# Patient Record
Sex: MALE | Race: WHITE | HISPANIC OR LATINO | Employment: STUDENT | URBAN - METROPOLITAN AREA
[De-identification: names, ages, dates, MRNs, and addresses within clinical notes are randomized per-mention and may not be internally consistent; named-entity substitution may affect disease eponyms.]

---

## 2017-01-06 ENCOUNTER — TELEPHONE (OUTPATIENT)
Dept: ELECTROPHYSIOLOGY | Facility: CLINIC | Age: 22
End: 2017-01-06

## 2017-01-06 NOTE — TELEPHONE ENCOUNTER
Nurse tried to call Mr Shaw to confirm appt times and review instructions but unable to leave message b/c voice mail not set up. Nurse than called his  from [facility], Mr Fredi Gu and reviewed instructions of labs on 1/10 here at [facility] and procedure  1/12/17 at 6am and not to eat anything after midnight on 1/11/17. Mr Gu acknowledged message and will relay to Mr Shaw and have him call me to confirm.

## 2017-01-06 NOTE — TELEPHONE ENCOUNTER
Nurse called and spoke with Mr Shaw and confirmed labs on 1/10/17 here at main campus and procedure 1/12/17 at 6am arrival. Reminded patient of npo after midnight on 1/11/17. Patient acknowledged.    ----- Message from Jenna Beck sent at 1/6/2017 10:06 AM CST -----  Contact: Pt  Pt returned call in regards to instructions    Pt can be reached at [phone] or [phone]    Thank you   NW

## 2017-01-10 ENCOUNTER — LAB VISIT (OUTPATIENT)
Dept: LAB | Facility: HOSPITAL | Age: 22
End: 2017-01-10
Attending: INTERNAL MEDICINE
Payer: COMMERCIAL

## 2017-01-10 ENCOUNTER — TELEPHONE (OUTPATIENT)
Dept: ELECTROPHYSIOLOGY | Facility: CLINIC | Age: 22
End: 2017-01-10

## 2017-01-10 DIAGNOSIS — I45.6 WPW (WOLFF-PARKINSON-WHITE SYNDROME): ICD-10-CM

## 2017-01-10 LAB
ANION GAP SERPL CALC-SCNC: 7 MMOL/L
APTT BLDCRRT: 26.3 SEC
BASOPHILS # BLD AUTO: 0.02 K/UL
BASOPHILS NFR BLD: 0.3 %
BUN SERPL-MCNC: 17 MG/DL
CALCIUM SERPL-MCNC: 9.5 MG/DL
CHLORIDE SERPL-SCNC: 104 MMOL/L
CO2 SERPL-SCNC: 30 MMOL/L
CREAT SERPL-MCNC: 0.9 MG/DL
DIFFERENTIAL METHOD: ABNORMAL
EOSINOPHIL # BLD AUTO: 0.3 K/UL
EOSINOPHIL NFR BLD: 4.8 %
ERYTHROCYTE [DISTWIDTH] IN BLOOD BY AUTOMATED COUNT: 13.4 %
EST. GFR  (AFRICAN AMERICAN): >60 ML/MIN/1.73 M^2
EST. GFR  (NON AFRICAN AMERICAN): >60 ML/MIN/1.73 M^2
GLUCOSE SERPL-MCNC: 56 MG/DL
HCT VFR BLD AUTO: 43.3 %
HGB BLD-MCNC: 14.9 G/DL
INR PPP: 1
LYMPHOCYTES # BLD AUTO: 1.7 K/UL
LYMPHOCYTES NFR BLD: 26.9 %
MCH RBC QN AUTO: 31.1 PG
MCHC RBC AUTO-ENTMCNC: 34.4 %
MCV RBC AUTO: 90 FL
MONOCYTES # BLD AUTO: 0.5 K/UL
MONOCYTES NFR BLD: 7.2 %
NEUTROPHILS # BLD AUTO: 3.8 K/UL
NEUTROPHILS NFR BLD: 60.6 %
PLATELET # BLD AUTO: 218 K/UL
PMV BLD AUTO: 11.2 FL
POTASSIUM SERPL-SCNC: 4 MMOL/L
PROTHROMBIN TIME: 10.8 SEC
RBC # BLD AUTO: 4.79 M/UL
SODIUM SERPL-SCNC: 141 MMOL/L
WBC # BLD AUTO: 6.24 K/UL

## 2017-01-10 PROCEDURE — 85730 THROMBOPLASTIN TIME PARTIAL: CPT

## 2017-01-10 PROCEDURE — 80048 BASIC METABOLIC PNL TOTAL CA: CPT

## 2017-01-10 PROCEDURE — 36415 COLL VENOUS BLD VENIPUNCTURE: CPT

## 2017-01-10 PROCEDURE — 85025 COMPLETE CBC W/AUTO DIFF WBC: CPT

## 2017-01-10 PROCEDURE — 85610 PROTHROMBIN TIME: CPT

## 2017-01-10 NOTE — TELEPHONE ENCOUNTER
Patient called to question if all we needed was blood work today and nurse assured him that was it just blood work. Patient confirmed procedure on 1/12/17. Nurse review instructions and patient will be here at 6am and will be npo after midnight on 1/11/17. Patient agreed.  ---- Message from Evelyn Lawrence MA sent at 1/10/2017 12:32 PM CST -----  Contact: patient called  Segun please call the patient at [phone] he need to talk to you about his surgery that schedule. Thank you.

## 2017-01-12 ENCOUNTER — ANESTHESIA (OUTPATIENT)
Dept: MEDSURG UNIT | Facility: HOSPITAL | Age: 22
End: 2017-01-12
Payer: COMMERCIAL

## 2017-01-12 ENCOUNTER — SURGERY (OUTPATIENT)
Age: 22
End: 2017-01-12

## 2017-01-12 ENCOUNTER — ANESTHESIA EVENT (OUTPATIENT)
Dept: MEDSURG UNIT | Facility: HOSPITAL | Age: 22
End: 2017-01-12
Payer: COMMERCIAL

## 2017-01-12 PROBLEM — I45.6 WPW (WOLFF-PARKINSON-WHITE SYNDROME): Status: ACTIVE | Noted: 2017-01-12

## 2017-01-12 PROCEDURE — 63600175 PHARM REV CODE 636 W HCPCS: Performed by: NURSE ANESTHETIST, CERTIFIED REGISTERED

## 2017-01-12 PROCEDURE — 25000003 PHARM REV CODE 250: Performed by: NURSE ANESTHETIST, CERTIFIED REGISTERED

## 2017-01-12 PROCEDURE — D9220A PRA ANESTHESIA: Mod: CRNA,,, | Performed by: NURSE ANESTHETIST, CERTIFIED REGISTERED

## 2017-01-12 PROCEDURE — D9220A PRA ANESTHESIA: Mod: ANES,,, | Performed by: ANESTHESIOLOGY

## 2017-01-12 RX ORDER — LIDOCAINE HYDROCHLORIDE 10 MG/ML
INJECTION, SOLUTION INTRAVENOUS
Status: DISCONTINUED | OUTPATIENT
Start: 2017-01-12 | End: 2017-01-12

## 2017-01-12 RX ORDER — PROPOFOL 10 MG/ML
VIAL (ML) INTRAVENOUS
Status: DISCONTINUED | OUTPATIENT
Start: 2017-01-12 | End: 2017-01-12

## 2017-01-12 RX ORDER — FENTANYL CITRATE 50 UG/ML
INJECTION, SOLUTION INTRAMUSCULAR; INTRAVENOUS
Status: DISCONTINUED | OUTPATIENT
Start: 2017-01-12 | End: 2017-01-12

## 2017-01-12 RX ORDER — ONDANSETRON 2 MG/ML
INJECTION INTRAMUSCULAR; INTRAVENOUS
Status: DISCONTINUED | OUTPATIENT
Start: 2017-01-12 | End: 2017-01-12

## 2017-01-12 RX ORDER — PROPOFOL 10 MG/ML
VIAL (ML) INTRAVENOUS CONTINUOUS PRN
Status: DISCONTINUED | OUTPATIENT
Start: 2017-01-12 | End: 2017-01-12

## 2017-01-12 RX ORDER — MIDAZOLAM HYDROCHLORIDE 1 MG/ML
INJECTION, SOLUTION INTRAMUSCULAR; INTRAVENOUS
Status: DISCONTINUED | OUTPATIENT
Start: 2017-01-12 | End: 2017-01-12

## 2017-01-12 RX ORDER — SODIUM CHLORIDE 9 MG/ML
INJECTION, SOLUTION INTRAVENOUS CONTINUOUS PRN
Status: DISCONTINUED | OUTPATIENT
Start: 2017-01-12 | End: 2017-01-12

## 2017-01-12 RX ADMIN — MIDAZOLAM HYDROCHLORIDE 2 MG: 1 INJECTION INTRAMUSCULAR; INTRAVENOUS at 07:01

## 2017-01-12 RX ADMIN — PROPOFOL 40 MG: 10 INJECTION, EMULSION INTRAVENOUS at 10:01

## 2017-01-12 RX ADMIN — ISOPROTERENOL HYDROCHLORIDE 2 MCG/MIN: 0.2 INJECTION, SOLUTION INTRACARDIAC; INTRAMUSCULAR; INTRAVENOUS; SUBCUTANEOUS at 09:01

## 2017-01-12 RX ADMIN — FENTANYL CITRATE 50 MCG: 50 INJECTION, SOLUTION INTRAMUSCULAR; INTRAVENOUS at 08:01

## 2017-01-12 RX ADMIN — PROPOFOL 175 MCG/KG/MIN: 10 INJECTION, EMULSION INTRAVENOUS at 07:01

## 2017-01-12 RX ADMIN — SODIUM CHLORIDE: 0.9 INJECTION, SOLUTION INTRAVENOUS at 07:01

## 2017-01-12 RX ADMIN — LIDOCAINE HYDROCHLORIDE 100 MG: 10 INJECTION, SOLUTION INTRAVENOUS at 07:01

## 2017-01-12 RX ADMIN — FENTANYL CITRATE 50 MCG: 50 INJECTION, SOLUTION INTRAMUSCULAR; INTRAVENOUS at 09:01

## 2017-01-12 RX ADMIN — ONDANSETRON 4 MG: 2 INJECTION INTRAMUSCULAR; INTRAVENOUS at 08:01

## 2017-01-12 RX ADMIN — MIDAZOLAM HYDROCHLORIDE 1 MG: 1 INJECTION INTRAMUSCULAR; INTRAVENOUS at 09:01

## 2017-01-12 RX ADMIN — PROPOFOL 40 MG: 10 INJECTION, EMULSION INTRAVENOUS at 07:01

## 2017-01-12 NOTE — ANESTHESIA POSTPROCEDURE EVALUATION
"Anesthesia Post Evaluation    Patient: Yonis Shaw    Procedure(s) Performed: Procedure(s) (LRB):  ABLATION (N/A)    Final Anesthesia Type: general  Patient location during evaluation: PACU  Patient participation: Yes- Able to Participate  Level of consciousness: awake and alert  Post-procedure vital signs: reviewed and stable  Pain management: adequate  Airway patency: patent  PONV status at discharge: No PONV  Anesthetic complications: no      Cardiovascular status: blood pressure returned to baseline  Respiratory status: unassisted  Hydration status: euvolemic  Follow-up not needed.        Visit Vitals    BP (!) 108/56    Pulse 64    Temp 36.5 °C (97.7 °F) (Axillary)    Resp 18    Ht 5' 9" (1.753 m)    Wt 65.8 kg (145 lb)    SpO2 99%    BMI 21.41 kg/m2       Pain/Johnathan Score: Pain Assessment Performed: Yes (1/12/2017 11:30 AM)  Presence of Pain: denies (1/12/2017 11:30 AM)  Johnathan Score: 9 (1/12/2017 11:30 AM)      "

## 2017-01-12 NOTE — ANESTHESIA PREPROCEDURE EVALUATION
01/12/2017  Yonis Shaw is a 21 y.o., male.    [facility] Anesthesia Evaluation    I have reviewed the Patient Summary Reports.     I have reviewed the Medications.     Review of Systems  Anesthesia Hx:  Neg history of prior surgery.  Denies Personal Hx of Anesthesia complications.   Social:  Non-Smoker    Cardiovascular:   WPW noted       Physical Exam  General:  Well nourished    Airway/Jaw/Neck:  Airway Findings: Mouth Opening: Normal Tongue: Normal  General Airway Assessment: Adult  Mallampati: II  Improves to I with phonation.  TM Distance: Normal, at least 6 cm  Jaw/Neck Findings:  Neck ROM: Normal ROM       Chest/Lungs:  Chest/Lungs Findings: Normal Respiratory Rate     Heart/Vascular:  Heart Findings: Rhythm: Regular Rhythm        Mental Status:  Mental Status Findings:  Alert and Oriented         Anesthesia Plan  Type of Anesthesia, risks & benefits discussed:  Anesthesia Type:  general  Patient's Preference: General   Intra-op Monitoring Plan: standard ASA monitors  Intra-op Monitoring Plan Comments:   Post Op Pain Control Plan:   Post Op Pain Control Plan Comments:   Induction:   IV  Beta Blocker:  Patient is not currently on a Beta-Blocker (No further documentation required).       Informed Consent: Patient understands risks and agrees with Anesthesia plan.  Questions answered. Anesthesia consent signed with patient.  ASA Score: 2     Day of Surgery Review of History & Physical:    H&P update referred to the surgeon.     Anesthesia Plan Notes: NPO confirmed.   No family history of anesthesia problems.         Ready For Surgery From Anesthesia Perspective.

## 2017-01-12 NOTE — ANESTHESIA RELEASE NOTE
"Anesthesia Release from PACU Note    Patient: Yonis Shaw    Procedure(s) Performed: Procedure(s) (LRB):  ABLATION (N/A)    Anesthesia type: general    Post pain: Adequate analgesia    Post assessment: no apparent anesthetic complications    Last Vitals:   Visit Vitals    BP (!) 108/56    Pulse 64    Temp 36.5 °C (97.7 °F) (Axillary)    Resp 18    Ht 5' 9" (1.753 m)    Wt 65.8 kg (145 lb)    SpO2 99%    BMI 21.41 kg/m2       Post vital signs: stable    Level of consciousness: awake    Nausea/Vomiting: no nausea/no vomiting    Complications: none    Airway Patency: patent    Respiratory: unassisted    Cardiovascular: stable and blood pressure at baseline    Hydration: euvolemic  "

## 2017-01-12 NOTE — TRANSFER OF CARE
"Anesthesia Transfer of Care Note    Patient: Yonis Shaw    Procedure(s) Performed: Procedure(s) (LRB):  ABLATION (N/A)    Patient location: PACU    Anesthesia Type: general    Transport from OR: Transported from OR on 6-10 L/min O2 by face mask with adequate spontaneous ventilation    Post pain: adequate analgesia    Post assessment: no apparent anesthetic complications    Post vital signs: stable    Level of consciousness: awake and alert    Nausea/Vomiting: no nausea/vomiting    Complications: none          Last vitals:   Visit Vitals    BP (!) 115/59 (BP Location: Right arm, Patient Position: Lying, BP Method: Automatic)    Pulse 70    Temp 36.5 °C (97.7 °F) (Axillary)    Resp 18    Ht 5' 9" (1.753 m)    Wt 65.8 kg (145 lb)    SpO2 100%    BMI 21.41 kg/m2     "

## 2017-01-13 DIAGNOSIS — I45.6 WPW (WOLFF-PARKINSON-WHITE SYNDROME): Primary | ICD-10-CM

## 2017-02-03 ENCOUNTER — TELEPHONE (OUTPATIENT)
Dept: ELECTROPHYSIOLOGY | Facility: CLINIC | Age: 22
End: 2017-02-03

## 2017-02-03 NOTE — TELEPHONE ENCOUNTER
Spoke to Emily, she need cpt code and nurse did not know, he directed her to Nuc Stress Dept For assisitance  ----- Message from Evelyn Lawrence MA sent at 2/2/2017  2:26 PM CST -----  Contact: Emily  Please call Emily at ext [phone] she need a  Code for the patient test that schedule on 2-  For the patient   insurance. Thank you.

## 2017-02-14 ENCOUNTER — CLINICAL SUPPORT (OUTPATIENT)
Dept: CARDIOLOGY | Facility: CLINIC | Age: 22
End: 2017-02-14
Payer: COMMERCIAL

## 2017-02-14 DIAGNOSIS — I45.6 WPW (WOLFF-PARKINSON-WHITE SYNDROME): ICD-10-CM

## 2017-02-14 PROCEDURE — 78452 HT MUSCLE IMAGE SPECT MULT: CPT | Mod: S$GLB,,, | Performed by: INTERNAL MEDICINE

## 2017-02-14 PROCEDURE — 93015 CV STRESS TEST SUPVJ I&R: CPT | Mod: S$GLB,,, | Performed by: INTERNAL MEDICINE

## 2017-02-14 PROCEDURE — A9502 TC99M TETROFOSMIN: HCPCS | Mod: S$GLB,,, | Performed by: INTERNAL MEDICINE

## 2017-02-17 ENCOUNTER — TELEPHONE (OUTPATIENT)
Dept: ELECTROPHYSIOLOGY | Facility: CLINIC | Age: 22
End: 2017-02-17

## 2017-02-17 NOTE — TELEPHONE ENCOUNTER
----- Message from Farrah Aguero sent at 2/17/2017  9:06 AM CST -----  Contact: Fredi Stout [phone] his   Fredi is doing a f/u on a class excuse for this pt showing he was here at the clinic on 2/14/17. He said he spoke to someone on yesterday and gave the fax number where to send the excuse.    Thanks

## 2017-02-20 ENCOUNTER — OFFICE VISIT (OUTPATIENT)
Dept: ELECTROPHYSIOLOGY | Facility: CLINIC | Age: 22
End: 2017-02-20
Payer: COMMERCIAL

## 2017-02-20 ENCOUNTER — HOSPITAL ENCOUNTER (OUTPATIENT)
Dept: CARDIOLOGY | Facility: CLINIC | Age: 22
Discharge: HOME | End: 2017-02-20
Payer: COMMERCIAL

## 2017-02-20 VITALS
DIASTOLIC BLOOD PRESSURE: 76 MMHG | WEIGHT: 152 LBS | SYSTOLIC BLOOD PRESSURE: 128 MMHG | HEART RATE: 59 BPM | HEIGHT: 69 IN | BODY MASS INDEX: 22.51 KG/M2

## 2017-02-20 DIAGNOSIS — I45.6 WPW (WOLFF-PARKINSON-WHITE SYNDROME): Primary | ICD-10-CM

## 2017-02-20 DIAGNOSIS — I49.9 CARDIAC ARRHYTHMIA, UNSPECIFIED CARDIAC ARRHYTHMIA TYPE: ICD-10-CM

## 2017-02-20 PROCEDURE — 93000 ELECTROCARDIOGRAM COMPLETE: CPT | Mod: S$GLB,,, | Performed by: INTERNAL MEDICINE

## 2017-02-20 PROCEDURE — 99214 OFFICE O/P EST MOD 30 MIN: CPT | Mod: S$GLB,,, | Performed by: INTERNAL MEDICINE

## 2017-02-20 PROCEDURE — 99999 PR PBB SHADOW E&M-EST. PATIENT-LVL III: CPT | Mod: PBBFAC,,, | Performed by: INTERNAL MEDICINE

## 2017-02-20 NOTE — MR AVS SNAPSHOT
"    [facility]  [address]  [facility] [address]  Phone: [phone]  Fax: [phone]                  Yonis Shaw   2017 9:00 AM   Office Visit    Description:  Male : 1995   Provider:  Arben Arriaza MD   Department:  [facility]           Reason for Visit     Ángela-Parkinson-White Syndrome           Diagnoses this Visit        Comments    WPW (Ángela-Parkinson-White syndrome)    -  Primary            To Do List           Goals (5 Years of Data)     None      Follow-Up and Disposition     Return in about 1 year (around 2018).      [facility] On Call     [facility] On Call Nurse Care Line -  Assistance  Registered nurses in the [facility] On Call Center provide clinical advisement, health education, appointment booking, and other advisory services.  Call for this free service at [phone].             Medications           Message regarding Medications     Verify the changes and/or additions to your medication regime listed below are the same as discussed with your clinician today.  If any of these changes or additions are incorrect, please notify your healthcare provider.             Verify that the below list of medications is an accurate representation of the medications you are currently taking.  If none reported, the list may be blank. If incorrect, please contact your healthcare provider. Carry this list with you in case of emergency.                Clinical Reference Information           Your Vitals Were     BP Pulse Height Weight BMI    128/76 59 5' 9" (1.753 m) 68.9 kg (152 lb) 22.45 kg/m2      Blood Pressure          Most Recent Value    BP  128/76      Allergies as of 2017     No Known Allergies      Immunizations Administered on Date of Encounter - 2017     None      Orders Placed During Today's Visit     Future Labs/Procedures Expected by Expires    Cardiac treadmill stress test  2018      [facility] Sign-Up  "    Activating your [facility] account is as easy as 1-2-3!     1) Visit [email or web address], select Sign Up Now, enter this activation code and your date of birth, then select Next.  [email or web address]  Expires: 2/27/2017  8:38 AM      2) Create a username and password to use when you visit [facility] in the future and select a security question in case you lose your password and select Next.    3) Enter your e-mail address and click Sign Up!    Additional Information  If you have questions, please e-mail [email or web address] or call [phone] to talk to our [facility] staff. Remember, [facility] is NOT to be used for urgent needs. For medical emergencies, dial 911.         Language Assistance Services     ATTENTION: Language assistance services are available, free of charge. Please call [phone].      ATENCIÓN: Si habla kitty, tiene a lilly disposición servicios gratuitos de asistencia lingüística. Llame al [phone].     PARAMJIT Ý: N?u b?n nói Ti?ng Vi?t, có các d?ch v? h? tr? ngôn ng? mi?n phí dành cho b?n. G?i s? [phone].         [facility] complies with applicable Federal civil rights laws and does not discriminate on the basis of race, color, national origin, age, disability, or sex.

## 2017-02-20 NOTE — PROGRESS NOTES
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 He was found to have WPW pattern on his ECG earlier in 2016 via a routine physical evaluation for his athletic program. He also underwent a 2D-Echo, a carotid doppler US and leg US (venous and arterial doppler); all of which showed no significant abnormalities. At his initial office visit, Mr. Shaw denied a family history of sudden death, in addition, he denied a personal history of palpitations or syncope.    At his initial office visit, I reviewed all ECGs in the EMR. Several ECGs were consistent with preexcitation utilizing a right posterior AP. Mr. Shaw underwent a Exercise Stress Test (10/12/16) in order to determine the risk associated with his AP. He maintained conduction over an AP over the course of exercise and into recovery (max  bpm). Interestingly, the preexcitation pattern suggested a second AP, this one most likely anteroseptal; septal accessory pathway with antegrade conduction at heart rates up to 180 beats per minute during exercise treadmill testing.    Mr. Shaw underwent a successful EPS/RFA (01/12/17); EPS revealed a mid-septal AP deemed potentially dangerous (given an AP ERP of 240 ms at a drive train cycle length of 400 ms on high-dose Isuprel); a successful AP ablation, with AP mapped to the mid-septal location near the superior lip of the CS os. This was a complex procedure given the proximity of the AP to the anatomic position of the AV mahnaz fast pathway, and the painstaking mapping required in this area to find a region which was not prohibitively dangerous to ablate.     Since the procedure, Mr. Shaw reports feeling well; He denies chest pain, SOB/OLIVEIRA, dizziness, palpitations, or syncope.     Recent cardiac studies:  NM Stress Test (02/14/17): The EKG portion of this  study was negative for ischemia at a high workload, and peak heart rate of 169 bpm (85% of predicted). The perfusion scan was free of evidence for myocardial ischemia or injury. No AP noted throughout study.     I reviewed today's ECG which demonstrated SR w/sinus arrhythmia; only intermittent preexcitation noted at 59 bpm; , , and QTc 429.    Review of Systems   Constitution: Negative for diaphoresis, weakness and malaise/fatigue.   HENT: Negative for headaches and nosebleeds.    Eyes: Negative for double vision.   Cardiovascular: Negative for chest pain, claudication, cyanosis, dyspnea on exertion, irregular heartbeat, leg swelling, near-syncope, orthopnea, palpitations, paroxysmal nocturnal dyspnea and syncope.   Respiratory: Negative for shortness of breath.    Skin: Negative.    Musculoskeletal: Negative.    Gastrointestinal: Negative for abdominal pain, hematemesis and hematochezia.   Genitourinary: Negative for hematuria.   Neurological: Negative for dizziness and light-headedness.   Psychiatric/Behavioral: Negative for altered mental status.        Objective:    Physical Exam   Constitutional: He is oriented to person, place, and time. He appears well-developed and well-nourished.   HENT:   Head: Normocephalic and atraumatic.   Eyes: Pupils are equal, round, and reactive to light.   Neck: Normal range of motion. Neck supple.   Cardiovascular: Normal rate, regular rhythm, normal heart sounds and intact distal pulses.    Pulmonary/Chest: Effort normal and breath sounds normal.   Abdominal: Soft. Bowel sounds are normal.   Musculoskeletal: Normal range of motion.   Neurological: He is alert and oriented to person, place, and time.   Vitals reviewed.        Assessment:       1. WPW (Ángela-Parkinson-White syndrome)         Plan:       In summary, Mr. Shaw is a 21 y.o. male with a hx of WPW s/p successful mid-septal RFA, with no AP noted on recent NM Stress Test and only very intermittent  preexcitation noted on current ECG. Mr. Shaw is doing well from a rhythm perspective and is able to return to full activity without limitation.    Follow up with PCP as directed.   Follow up in clinic in 1 year with repeat Exercise Treadmill Stress Test, sooner as needed.     Harper Byrd, [place], APRN, FNP-C    EP ATTENDING ADDENDUM:    Pt with recent RFA of mid-septal AP, with recurrent AP conduction within 1-day. However, today he is exhibiting only intermittent preexcitation. On a recent exercise treadmill test, no AP conduction was seen during the study (including peak stress and recovery). Mr. Shaw can resume full running training. He will see me again in 1-year. A repeat exercise treadmill test will be done prior to that visit.    Thank you for allowing me to participate in the care of this patient. Please do not hesitate to call me with any questions or concerns.    Arben Arriaza MD